# Patient Record
Sex: MALE | Race: BLACK OR AFRICAN AMERICAN | NOT HISPANIC OR LATINO | URBAN - METROPOLITAN AREA
[De-identification: names, ages, dates, MRNs, and addresses within clinical notes are randomized per-mention and may not be internally consistent; named-entity substitution may affect disease eponyms.]

---

## 2022-10-05 DIAGNOSIS — Z12.11 ENCOUNTER FOR SCREENING COLONOSCOPY: Primary | ICD-10-CM

## 2023-01-03 ENCOUNTER — TELEPHONE (OUTPATIENT)
Dept: GASTROENTEROLOGY | Facility: CLINIC | Age: 58
End: 2023-01-03

## 2023-01-03 NOTE — TELEPHONE ENCOUNTER
----- Message from Renetta Augustine sent at 1/3/2023  9:28 AM CST -----  Regarding: referral  Angelica from [facility] called to check status of referral. No updates listed. Please advise  [phone] ex [phone]

## 2023-01-03 NOTE — TELEPHONE ENCOUNTER
Spoke with Angelica. Advised that we are not accepting referrals for screening colonoscopies due to a large backlog. Pt referral has been placed on [facility] queue. Verbalized understanding

## 2024-06-11 ENCOUNTER — HOSPITAL ENCOUNTER (OUTPATIENT)
Dept: RADIOLOGY | Facility: HOSPITAL | Age: 59
Discharge: HOME | End: 2024-06-11
Attending: NURSE PRACTITIONER
Payer: COMMERCIAL

## 2024-06-11 DIAGNOSIS — S43.61XA: ICD-10-CM

## 2024-06-11 DIAGNOSIS — S43.61XA: Primary | ICD-10-CM

## 2024-06-11 PROCEDURE — 73030 X-RAY EXAM OF SHOULDER: CPT | Mod: TC,RT

## 2024-11-20 DIAGNOSIS — R22.1 FULLNESS OF NECK: Primary | ICD-10-CM

## 2024-11-26 ENCOUNTER — HOSPITAL ENCOUNTER (OUTPATIENT)
Dept: RADIOLOGY | Facility: HOSPITAL | Age: 59
Discharge: HOME | End: 2024-11-26
Payer: COMMERCIAL

## 2024-11-26 DIAGNOSIS — R22.1 FULLNESS OF NECK: ICD-10-CM

## 2024-11-26 PROCEDURE — 76536 US EXAM OF HEAD AND NECK: CPT | Mod: TC
